# Patient Record
Sex: MALE | URBAN - METROPOLITAN AREA
[De-identification: names, ages, dates, MRNs, and addresses within clinical notes are randomized per-mention and may not be internally consistent; named-entity substitution may affect disease eponyms.]

---

## 2017-03-24 ENCOUNTER — HOSPITAL ENCOUNTER (EMERGENCY)
Facility: CLINIC | Age: 32
Discharge: HOME | End: 2017-03-24
Attending: EMERGENCY MEDICINE | Admitting: EMERGENCY MEDICINE

## 2017-03-24 VITALS
SYSTOLIC BLOOD PRESSURE: 115 MMHG | TEMPERATURE: 98.3 F | HEART RATE: 63 BPM | OXYGEN SATURATION: 100 % | DIASTOLIC BLOOD PRESSURE: 81 MMHG | WEIGHT: 163 LBS | RESPIRATION RATE: 16 BRPM

## 2017-03-24 DIAGNOSIS — K11.20 SIALOADENITIS OF SUBMANDIBULAR GLAND: ICD-10-CM

## 2017-03-24 DIAGNOSIS — K11.21 ACUTE PAROTITIS: ICD-10-CM

## 2017-03-24 PROCEDURE — 86735 MUMPS ANTIBODY: CPT | Performed by: EMERGENCY MEDICINE

## 2017-03-24 PROCEDURE — 99283 EMERGENCY DEPT VISIT LOW MDM: CPT

## 2017-03-24 PROCEDURE — 99284 EMERGENCY DEPT VISIT MOD MDM: CPT | Mod: Z6 | Performed by: EMERGENCY MEDICINE

## 2017-03-24 ASSESSMENT — ENCOUNTER SYMPTOMS
FEVER: 0
FACIAL SWELLING: 1

## 2017-03-24 NOTE — ED AVS SNAPSHOT
[facility], [place], Emergency Department    [address]    [facility] [address]    Phone:  [phone]    Fax:  [phone]                                       Wilder Dawn   MRN: [id number]    Department:  [facility], Emergency Department   Date of Visit:  3/24/2017           After Visit Summary Signature Page     I have received my discharge instructions, and my questions have been answered. I have discussed any challenges I see with this plan with the nurse or doctor.    ..........................................................................................................................................  Patient/Patient Representative Signature      ..........................................................................................................................................  Patient Representative Print Name and Relationship to Patient    ..................................................               ................................................  Date                                            Time    ..........................................................................................................................................  Reviewed by Signature/Title    ...................................................              ..............................................  Date                                                            Time

## 2017-03-24 NOTE — ED AVS SNAPSHOT
[facility], Emergency Department    [address]    [place] [address]    Phone:  [phone]    Fax:  [phone]                                       Wilder Dawn   MRN: [id number]    Department:  [facility], Emergency Department   Date of Visit:  3/24/2017           Patient Information     Date Of Birth          1985        Your diagnoses for this visit were:     Sialoadenitis of submandibular gland        You were seen by uLcas Cox MD.        Discharge Instructions       Please make an appointment to follow up with [facility] (phone: [phone]) in 3-5 days if not improving.    Augmentin as directed.    Warm compresses to area of swelling.    Suck on hard, sour candy every few hours to stimulate saliva production.    Tylenol and/or ibuprofen for pain and/or fever.    Return to the emergency Department for any problems.      Discharge References/Attachments     SALIVARY GLAND INFECTION (ENGLISH)      24 Hour Appointment Hotline       To make an appointment at any [facility] clinic, call [phone] ([phone]). If you don't have a family doctor or clinic, we will help you find one. [place] [facility] clinics are conveniently located to serve the needs of you and your family.             Review of your medicines      START taking        Dose / Directions Last dose taken    amoxicillin-clavulanate 875-125 MG per tablet   Commonly known as:  AUGMENTIN   Dose:  1 tablet   Quantity:  20 tablet        Take 1 tablet by mouth 2 times daily for 10 days   Refills:  0                Prescriptions were sent or printed at these locations (1 Prescription)                   Other Prescriptions                Printed at Department/Unit printer (1 of 1)         amoxicillin-clavulanate (AUGMENTIN) 875-125 MG per tablet                Procedures and tests performed during your visit     Mumps Antibody IgG    Mumps antibody IgM      Orders Needing Specimen Collection      "None      Pending Results     No orders found from 3/22/2017 to 3/25/2017.            Pending Culture Results     No orders found from 3/22/2017 to 3/25/2017.            Thank you for choosing [facility]       Thank you for choosing [facility] for your care. Our goal is always to provide you with excellent care. Hearing back from our patients is one way we can continue to improve our services. Please take a few minutes to complete the written survey that you may receive in the mail after you visit with us. Thank you!        [employer] Information     [employer] lets you send messages to your doctor, view your test results, renew your prescriptions, schedule appointments and more. To sign up, go to [email or web address] . Click on \"Log in\" on the left side of the screen, which will take you to the Welcome page. Then click on \"Sign up Now\" on the right side of the page.     You will be asked to enter the access code listed below, as well as some personal information. Please follow the directions to create your username and password.     Your access code is: [email or web address]  Expires: 2017 11:37 PM     Your access code will  in 90 days. If you need help or a new code, please call your [facility] clinic or [phone].        Care EveryWhere ID     This is your Care EveryWhere ID. This could be used by other organizations to access your [facility] medical records  [id number]        After Visit Summary       This is your record. Keep this with you and show to your community pharmacist(s) and doctor(s) at your next visit.                  "

## 2017-03-25 NOTE — DISCHARGE INSTRUCTIONS
Please make an appointment to follow up with [facility] (phone: [phone]) in 3-5 days if not improving.    Augmentin as directed.    Warm compresses to area of swelling.    Suck on hard, sour candy every few hours to stimulate saliva production.    Tylenol and/or ibuprofen for pain and/or fever.    Return to the emergency Department for any problems.

## 2017-03-25 NOTE — ED PROVIDER NOTES
History     Chief Complaint   Patient presents with     Facial Swelling     Right sided facial swelling, started 2 days ago.  Denies any dental pain, denies swelling down in neck area.  Reports pain with eating and talking (moving mouth), denies pain with swallowing.       VICKIE Dawn is a 31 year old, otherwise healthy male who presents with facial swelling and dental pain. The patient reports that he developed right lower jaw pain and right facial swelling about 2 days ago, which has been persistent since then. He notes that the pain worsens with laying on his right side, movement of his jaw, opening his mouth or eating. He denies any trauma to the area. He denies fever.     History reviewed. No pertinent past medical history.    History reviewed. No pertinent surgical history.    No family history on file.    Social History   Substance Use Topics     Smoking status: Never Smoker     Smokeless tobacco: Not on file     Alcohol use Yes      Comment: occ     No current facility-administered medications for this encounter.      Current Outpatient Prescriptions   Medication     amoxicillin-clavulanate (AUGMENTIN) 875-125 MG per tablet      No Known Allergies     I have reviewed the Medications, Allergies, Past Medical and Surgical History, and Social History in the Epic system.    Review of Systems   Constitutional: Negative for fever.   HENT: Positive for dental problem (right lower) and facial swelling (right-sided).    All other systems reviewed and are negative.      Physical Exam   BP: 132/88  Pulse: 60  Heart Rate: 60  Temp: 97.7  F (36.5  C)  Resp: 16  Weight: 73.9 kg (163 lb)  SpO2: 98 %  Physical Exam   Constitutional: He is oriented to person, place, and time. He appears well-developed and well-nourished.  Non-toxic appearance. He does not appear ill. No distress.   HENT:   Head: Normocephalic and atraumatic.   Mouth/Throat: Uvula is midline, oropharynx is clear and moist and mucous membranes are  normal. No trismus in the jaw. No oropharyngeal exudate, posterior oropharyngeal edema, posterior oropharyngeal erythema or tonsillar abscesses.   Eyes: EOM are normal. Pupils are equal, round, and reactive to light. No scleral icterus.   Neck: Trachea normal, normal range of motion, full passive range of motion without pain and phonation normal. Neck supple. No tracheal tenderness present. No tracheal deviation, no edema, no erythema and normal range of motion present. No thyroid mass present.       [place]ing and [place]'s duct productive of clear saliva   Cardiovascular: Normal rate.    Pulmonary/Chest: Effort normal. No stridor. No respiratory distress.   Lymphadenopathy:        Head (right side): Submandibular adenopathy present.   Neurological: He is alert and oriented to person, place, and time. He has normal strength. No sensory deficit.   Skin: Skin is warm and dry. No rash noted. No pallor.   Psychiatric: He has a normal mood and affect. His behavior is normal.   Nursing note and vitals reviewed.      ED Course     ED Course     Procedures   11:08 PM  The patient was seen and examined by Dr. Cox in Room 7.              Assessments & Plan (with Medical Decision Making)   This patient presented to the emergency department with submandibular swelling. This seemed consistent with an inflamed and swollen submandibular salivary gland. He had a viral prodrome, prior to this, and it is unclear whether or not he had undergone full mumps vaccinations, so mump titers were sent on the patient. He will be treated with antibiotics for the possibility of a bacterial infection, and otherwise was told to use hard, sour candy as a sialagogue. He should follow up with [facility] next week if still having problems, and return to the emergency department for any worsening or other issues.     I have reviewed the nursing notes.    I have reviewed the findings, diagnosis, plan and need for follow up with the  patient.    New Prescriptions    AMOXICILLIN-CLAVULANATE (AUGMENTIN) 875-125 MG PER TABLET    Take 1 tablet by mouth 2 times daily for 10 days       Final diagnoses:   Sialoadenitis of submandibular gland     IClara, am serving as a trained medical scribe to document services personally performed by Lucas Cox MD, based on the provider's statements to me.   Lucas SAMUEL MD, was physically present and have reviewed and verified the accuracy of this note documented by Clara Brown.     3/24/2017   [facility], [place], EMERGENCY DEPARTMENT     Lucas Cox MD  04/03/17 1111

## 2017-03-27 ENCOUNTER — TELEPHONE (OUTPATIENT)
Dept: EMERGENCY MEDICINE | Facility: CLINIC | Age: 32
End: 2017-03-27

## 2017-03-27 LAB — MUV IGG SER QL IA: 1.7 AI (ref 0–0.8)

## 2017-03-27 NOTE — TELEPHONE ENCOUNTER
[facility] Emergency Department Lab result notification:    [facility] ED lab result protocol used  [facility] Lab protocol    Reason for call  Notify of lab results, assess symptoms,  review ED providers recommendations/discharge instructions (if necessary) and advise per ED lab result f/u protocol    Lab Result  Final Mumps Antibody IgG level is 1.7 and this level is elevated.  Normal reference range is 0.0 - 0.8  Resulted after [facility] ED visit on this date 03/24/2017.  [facility] patient, RN to notify patient/parent of result and advise to relay result to their PCP immediately.  [ED lab result f/u RN may want to fax result to patient's PCP].    Awaiting Mumps Antibody IgM level.  Will call after that finalized.    Rochelle Ching, RN  [facility] RN  Lung Nodule and ED Lab Result F/u RN  Epic pool (ED late result f/u RN): [id number]  [facility] INCIDENTAL RADIOLOGY F/U NURSES: P [id number]  # [phone]      Copy of Lab result   Exam Information   Exam Date Exam Time Accession # Results    3/24/17 11:35 PM [id number]    Component Results   Component Value Flag Ref Range Units Status Collected Lab   Mumps Antibody IgG 1.7 (H) 0.0 - 0.8 AI Final 03/24/2017 11:35 PM 51   Comment:   Positive, suggests prev. exposure and probable immunity    Antibody index (AI) values reflect qualitative changes in antibody    concentration that cannot be directly associated with clinical condition or    disease state.

## 2017-03-28 LAB — MUV IGM SER IA-ACNC: 0.22
